# Patient Record
Sex: MALE | Race: WHITE | URBAN - METROPOLITAN AREA
[De-identification: names, ages, dates, MRNs, and addresses within clinical notes are randomized per-mention and may not be internally consistent; named-entity substitution may affect disease eponyms.]

---

## 2023-01-05 ENCOUNTER — EMERGENCY (EMERGENCY)
Facility: HOSPITAL | Age: 38
LOS: 0 days | Discharge: HOME | End: 2023-01-05
Attending: EMERGENCY MEDICINE
Payer: COMMERCIAL

## 2023-01-05 VITALS — HEIGHT: 69 IN | WEIGHT: 164.91 LBS

## 2023-01-05 VITALS — SYSTOLIC BLOOD PRESSURE: 150 MMHG | HEART RATE: 79 BPM | DIASTOLIC BLOOD PRESSURE: 124 MMHG
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
## 2023-01-05 DIAGNOSIS — Z88.0 ALLERGY STATUS TO PENICILLIN: ICD-10-CM
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
## 2023-01-05 DIAGNOSIS — M54.16 RADICULOPATHY, LUMBAR REGION: ICD-10-CM

## 2023-01-05 DIAGNOSIS — M54.50 LOW BACK PAIN, UNSPECIFIED: ICD-10-CM

## 2023-01-05 PROCEDURE — 99283 EMERGENCY DEPT VISIT LOW MDM: CPT

## 2023-01-05 PROCEDURE — 99284 EMERGENCY DEPT VISIT MOD MDM: CPT

## 2023-01-05 RX ORDER — DIAZEPAM 5 MG
1 TABLET ORAL
Qty: 6 | Refills: 0
Start: 2023-01-05 | End: 2023-01-07

## 2023-01-05 RX ORDER — LIDOCAINE 4 G/100G
1 CREAM TOPICAL ONCE
Refills: 0 | Status: COMPLETED | OUTPATIENT
Start: 2023-01-05 | End: 2023-01-05

## 2023-01-05 RX ORDER — DIAZEPAM 5 MG
5 TABLET ORAL ONCE
Refills: 0 | Status: DISCONTINUED | OUTPATIENT
Start: 2023-01-05 | End: 2023-01-05

## 2023-01-05 RX ADMIN — Medication 5 MILLIGRAM(S): at 21:21

## 2023-01-05 RX ADMIN — LIDOCAINE 1 PATCH: 4 CREAM TOPICAL at 21:22

## 2023-01-05 NOTE — ED STATDOCS - CLINICAL SUMMARY MEDICAL DECISION MAKING FREE TEXT BOX
Pt's story and pain most consistent with sciatica. Already taken maximum amount of NSAIDs. Will give muscle relaxer and topical analgesia. Follow up with orthopedics.

## 2023-01-05 NOTE — ED STATDOCS - NSFOLLOWUPINSTRUCTIONS_ED_ALL_ED_FT
INSTRUCTIONS FOR MEDROL DOSEPAK:    Day 1: Take 6 tabs by mouth  Day 2: Take 5 tabs by mouth  Day 3: Take 4 tabs by mouth  Day 4: Take 3 tabs by mouth  Day 5: Take 2 tabs by mouth  Day 6: Take 1 tab by mouth      Radicular Pain    Back view of a person showing a normal leg and a leg affected by the pain of sciatica.    Radicular pain is a type of pain that spreads from your back or neck along a spinal nerve. Spinal nerves are nerves that leave the spinal cord and go to the muscles. Radicular pain is sometimes called radiculopathy, radiculitis, or a pinched nerve. When you have this type of pain, you may also have weakness, numbness, or tingling in the area of your body that is supplied by the nerve. The pain may feel sharp and burning. Depending on which spinal nerve is affected, the pain may occur in the:  •Neck area (cervical radicular pain). You may also feel pain, numbness, weakness, or tingling in the arms.      •Mid-spine area (thoracic radicular pain). You would feel this pain in the back and chest. This type is rare.      •Lower back area (lumbar radicular pain). You would feel this pain as low back pain. You may feel pain, numbness, weakness, or tingling in the buttocks or legs. Sciatica is a type of lumbar radicular pain that shoots down the back of the leg.      Radicular pain occurs when one of the spinal nerves becomes irritated or squeezed (compressed). It is often caused by something pushing on a spinal nerve, such as one of the bones of the spine (vertebrae) or one of the round cushions between vertebrae (intervertebral disks). This can result from:  •An injury.       •Wear and tear or aging of a disk.       •The growth of a bone spur that pushes on the nerve.      Radicular pain often goes away when you follow instructions from your health care provider for relieving pain at home.      How is this treated?    Treatment may depend on the cause of the condition and may include:  •Working with a physical therapist.      •Taking pain medicine.      •Applying heat or ice or both to the affected areas.      •Doing stretches to improve flexibility.      •Having surgery. This may be needed if other treatments do not help. Different types of surgery may be done depending on the cause of this condition.        Follow these instructions at home:      Managing pain       Bag of ice on a towel on the skin.       A heating pad for use on the painful area.   •If directed, put ice on the affected area. To do this:  •Put ice in a plastic bag.      •Place a towel between your skin and the bag.      •Leave the ice on for 20 minutes, 2–3 times a day.      •Remove the ice if your skin turns bright red. This is very important. If you cannot feel pain, heat, or cold, you have a greater risk of damage to the area.      •If directed, apply heat to the affected area as often as told by your health care provider. Use the heat source that your health care provider recommends, such as a moist heat pack or a heating pad.  •Place a towel between your skin and the heat source.      •Leave the heat on for 20–30 minutes.      •Remove the heat if your skin turns bright red. This is especially important if you are unable to feel pain, heat, or cold. You have a greater risk of getting burned.        Activity     • Do not sit or rest in bed for long periods of time.      •Try to stay as active as possible. Ask your health care provider what type of exercise or activity is best for you.      •Avoid activities that make your pain worse, such as bending and lifting.      •You may have to avoid lifting. Ask your health care provider how much you can safely lift.      •Practice using proper technique when lifting items. Proper lifting technique involves bending your knees and rising up.      •Do strength and range-of-motion exercises only as told by your health care provider or physical therapist.      General instructions     •Take over-the-counter and prescription medicines only as told by your health care provider.      •Pay attention to any changes in your symptoms.      •Keep all follow-up visits. This is important.        Contact a health care provider if:    •Your pain and other symptoms get worse.      •Your pain medicine is not helping.      •Your pain has not improved after a few weeks of home care.      •You have a fever.        Get help right away if:    •You have severe pain, weakness, or numbness.      •You have difficulty with bladder or bowel control.        Summary    •Radicular pain is a type of pain that spreads from your back or neck along a spinal nerve.      •When you have radicular pain, you may also have weakness, numbness, or tingling in the area of your body that is supplied by the nerve.      •The pain may feel sharp or burning.      •Radicular pain may be treated with ice, heat, medicines, or physical therapy.      This information is not intended to replace advice given to you by your health care provider. Make sure you discuss any questions you have with your health care provider.

## 2023-01-05 NOTE — ED ADULT TRIAGE NOTE - CHIEF COMPLAINT QUOTE
Patient presented to the ED ambulatory c/o back pain/injury. Patient reports he was carrying steel today at work when he missed a step and injured his lower back. Patient reports pain radiating to his right leg. Patient arrived ambulatory but requested a wheel chair as he feels he cannot  bear weight on his right leg as it is too painful. Patient rates pain 10 out of 10 at this time. Patient denies n,v,d, cp, sob. Patient denies pmhx.

## 2023-01-05 NOTE — ED STATDOCS - OBJECTIVE STATEMENT
38 y/o male PMHx of arm and elbow surgery presents to the ED from  c/o back pain/injury. Patient reports he was carrying steel on his shoulder today at work when he missed a step and injured his lower back. Patient reports pain radiating to his right leg and tingling in his toes. Patient arrived ambulatory but requested a wheel chair as he feels he cannot  bear weight on his right leg as it is too painful. Patient rates pain 10 out of 10 at this time. Patient denies nausea, vomiting, diarrhea, chest pain, and sob. Pt reports he took 4 Aleve today and got a shot of Toroidal at . Pt reports he was prescribed a muscle relaxer at  today but was unable to get the medication because the pharmacy could not fill it. Pt reports allergy to Penicillin.

## 2023-01-05 NOTE — ED STATDOCS - CARE PROVIDER_API CALL
Aspen Cohn)  Orthopaedic Surgery  [address] [place] [address], [address] [place] [address]  Phone: [phone]  Fax: [phone]  Follow Up Time:

## 2023-01-05 NOTE — ED STATDOCS - PHYSICAL EXAMINATION
Gen: awake, alert, WD, WN, NAD  Head:  NC, AT  ENT:  PERRL, moist mucous membranes, OP-clear  Neck: supple, nontender  CV:  S1 S2, RRR, no M/G/R  Pulm:  CTAB, good air movement  Abd:  Soft, nontender, nondistended, +BS, no rebound/guarding  Back: +TTP R lumbar paraspinal muscles, +limited ROM  Ext:  warm, well perfused, moving all extremities spontaneously, no peripheral edema, distal pulses intact  Skin: intact  Neuro:  A&Ox3, no focal neuro deficit, speech clear

## 2023-01-05 NOTE — ED STATDOCS - ATTENDING APP SHARED VISIT CONTRIBUTION OF CARE
I, Lobito Xiao, performed the initial face to face bedside interview with this patient regarding history of present illness, review of symptoms and relevant past medical, social and family history.  I completed an independent physical examination.  I was the initial provider who evaluated this patient. I have signed out the follow up of any pending tests (i.e. labs, radiological studies) to the ACP.  I have communicated the patient’s plan of care and disposition with the ACP.  The history, relevant review of systems, past medical and surgical history, medical decision making, and physical examination was documented by the scribe in my presence and I attest to the accuracy of the documentation.

## 2023-01-05 NOTE — ED STATDOCS - NS ED ATTENDING STATEMENT MOD
This was a shared visit with the CITLALY. I reviewed and verified the documentation and independently performed the documented:

## 2023-01-05 NOTE — ED STATDOCS - PROGRESS NOTE DETAILS
36 y/o M with no PMH presents with right sided back pain radiating into his leg today. pt states he was carrying steel, when he states he felt a sudden pain in his back which radiated into his right leg. Went to outside urgent care, was provide toradol with no relief. Also took aleve at home with no change. Denies numbness/tingling in lower extremities, fever, chills, loss of bowel/bladder control. PE: Uncomfortable appearing. Cardiac: s1s2, RRR. Lungs: CTAB. Abdomen: NBS x4, soft, nontender. MSK: No obvious deformity to L-spine or lower extremities. No mldline L-spine tenderness. +right paraspinal lumbar tenderness. +SLR right. Patient ambulatory with difficulty. A/P: Likely sciatica/lumbar HNP. Plan for symptomatic care, strict return precautions. dc home with orthopedic follow up. - Donte Cano PA-C

## 2023-02-19 ENCOUNTER — NON-APPOINTMENT (OUTPATIENT)
Age: 38
End: 2023-02-19

## 2023-03-07 ENCOUNTER — NON-APPOINTMENT (OUTPATIENT)
Age: 38
End: 2023-03-07

## 2023-03-09 PROBLEM — Z00.00 ENCOUNTER FOR PREVENTIVE HEALTH EXAMINATION: Status: ACTIVE | Noted: 2023-03-09
